# Patient Record
Sex: MALE | Race: BLACK OR AFRICAN AMERICAN | Employment: FULL TIME | URBAN - METROPOLITAN AREA
[De-identification: names, ages, dates, MRNs, and addresses within clinical notes are randomized per-mention and may not be internally consistent; named-entity substitution may affect disease eponyms.]

---

## 2017-05-15 ENCOUNTER — OFFICE VISIT (OUTPATIENT)
Dept: FAMILY MEDICINE CLINIC | Age: 62
End: 2017-05-15

## 2017-05-15 VITALS
OXYGEN SATURATION: 97 % | WEIGHT: 194.8 LBS | SYSTOLIC BLOOD PRESSURE: 118 MMHG | HEIGHT: 72 IN | BODY MASS INDEX: 26.38 KG/M2 | TEMPERATURE: 97.8 F | DIASTOLIC BLOOD PRESSURE: 76 MMHG | HEART RATE: 98 BPM

## 2017-05-15 DIAGNOSIS — Z00.00 ROUTINE GENERAL MEDICAL EXAMINATION AT A HEALTH CARE FACILITY: ICD-10-CM

## 2017-05-15 DIAGNOSIS — Z11.4 SCREENING FOR HIV (HUMAN IMMUNODEFICIENCY VIRUS): ICD-10-CM

## 2017-05-15 DIAGNOSIS — Z11.59 NEED FOR HEPATITIS C SCREENING TEST: ICD-10-CM

## 2017-05-15 DIAGNOSIS — Z12.5 SCREENING PSA (PROSTATE SPECIFIC ANTIGEN): ICD-10-CM

## 2017-05-15 DIAGNOSIS — Z00.00 ROUTINE GENERAL MEDICAL EXAMINATION AT A HEALTH CARE FACILITY: Primary | ICD-10-CM

## 2017-05-15 DIAGNOSIS — Z23 NEED FOR TDAP VACCINATION: ICD-10-CM

## 2017-05-15 LAB
A/G RATIO: 2.2 (ref 1.1–2.2)
ALBUMIN SERPL-MCNC: 4.4 G/DL (ref 3.4–5)
ALP BLD-CCNC: 68 U/L (ref 40–129)
ALT SERPL-CCNC: 31 U/L (ref 10–40)
ANION GAP SERPL CALCULATED.3IONS-SCNC: 16 MMOL/L (ref 3–16)
AST SERPL-CCNC: 30 U/L (ref 15–37)
BASOPHILS ABSOLUTE: 0 K/UL (ref 0–0.2)
BASOPHILS RELATIVE PERCENT: 0.7 %
BILIRUB SERPL-MCNC: 0.4 MG/DL (ref 0–1)
BILIRUBIN, POC: NORMAL
BLOOD URINE, POC: NORMAL
BUN BLDV-MCNC: 14 MG/DL (ref 7–20)
CALCIUM SERPL-MCNC: 9 MG/DL (ref 8.3–10.6)
CHLORIDE BLD-SCNC: 104 MMOL/L (ref 99–110)
CHOLESTEROL, TOTAL: 183 MG/DL (ref 0–199)
CLARITY, POC: CLEAR
CO2: 24 MMOL/L (ref 21–32)
COLOR, POC: YELLOW
CREAT SERPL-MCNC: 1 MG/DL (ref 0.8–1.3)
EOSINOPHILS ABSOLUTE: 0.3 K/UL (ref 0–0.6)
EOSINOPHILS RELATIVE PERCENT: 3.5 %
GFR AFRICAN AMERICAN: >60
GFR NON-AFRICAN AMERICAN: >60
GLOBULIN: 2 G/DL
GLUCOSE BLD-MCNC: 98 MG/DL (ref 70–99)
GLUCOSE URINE, POC: NORMAL
HCT VFR BLD CALC: 47.4 % (ref 40.5–52.5)
HDLC SERPL-MCNC: 68 MG/DL (ref 40–60)
HEMOGLOBIN: 15.6 G/DL (ref 13.5–17.5)
HEPATITIS C ANTIBODY INTERPRETATION: NORMAL
KETONES, POC: NORMAL
LDL CHOLESTEROL CALCULATED: 104 MG/DL
LEUKOCYTE EST, POC: NORMAL
LYMPHOCYTES ABSOLUTE: 1.9 K/UL (ref 1–5.1)
LYMPHOCYTES RELATIVE PERCENT: 27.1 %
MCH RBC QN AUTO: 31.1 PG (ref 26–34)
MCHC RBC AUTO-ENTMCNC: 33 G/DL (ref 31–36)
MCV RBC AUTO: 94.1 FL (ref 80–100)
MONOCYTES ABSOLUTE: 0.6 K/UL (ref 0–1.3)
MONOCYTES RELATIVE PERCENT: 8.9 %
NEUTROPHILS ABSOLUTE: 4.3 K/UL (ref 1.7–7.7)
NEUTROPHILS RELATIVE PERCENT: 59.8 %
NITRITE, POC: NORMAL
PDW BLD-RTO: 13.1 % (ref 12.4–15.4)
PH, POC: 7
PLATELET # BLD: 161 K/UL (ref 135–450)
PMV BLD AUTO: 10.3 FL (ref 5–10.5)
POTASSIUM SERPL-SCNC: 4.5 MMOL/L (ref 3.5–5.1)
PROSTATE SPECIFIC ANTIGEN: 1.5 NG/ML (ref 0–4)
PROTEIN, POC: NORMAL
RBC # BLD: 5.03 M/UL (ref 4.2–5.9)
SODIUM BLD-SCNC: 144 MMOL/L (ref 136–145)
SPECIFIC GRAVITY, POC: 1.01
TOTAL PROTEIN: 6.4 G/DL (ref 6.4–8.2)
TRIGL SERPL-MCNC: 55 MG/DL (ref 0–150)
TSH SERPL DL<=0.05 MIU/L-ACNC: 1.8 UIU/ML (ref 0.27–4.2)
UROBILINOGEN, POC: 0.2
VITAMIN D 25-HYDROXY: 50.2 NG/ML
VLDLC SERPL CALC-MCNC: 11 MG/DL
WBC # BLD: 7.1 K/UL (ref 4–11)

## 2017-05-15 PROCEDURE — 90715 TDAP VACCINE 7 YRS/> IM: CPT | Performed by: FAMILY MEDICINE

## 2017-05-15 PROCEDURE — 99396 PREV VISIT EST AGE 40-64: CPT | Performed by: FAMILY MEDICINE

## 2017-05-15 PROCEDURE — 81002 URINALYSIS NONAUTO W/O SCOPE: CPT | Performed by: FAMILY MEDICINE

## 2017-05-15 PROCEDURE — 90471 IMMUNIZATION ADMIN: CPT | Performed by: FAMILY MEDICINE

## 2017-05-15 ASSESSMENT — PATIENT HEALTH QUESTIONNAIRE - PHQ9
SUM OF ALL RESPONSES TO PHQ QUESTIONS 1-9: 0
2. FEELING DOWN, DEPRESSED OR HOPELESS: 0
1. LITTLE INTEREST OR PLEASURE IN DOING THINGS: 0
SUM OF ALL RESPONSES TO PHQ9 QUESTIONS 1 & 2: 0

## 2017-05-15 ASSESSMENT — ENCOUNTER SYMPTOMS
GASTROINTESTINAL NEGATIVE: 1
ALLERGIC/IMMUNOLOGIC NEGATIVE: 1
RESPIRATORY NEGATIVE: 1
EYES NEGATIVE: 1

## 2017-05-17 LAB — HIV-1 AND HIV-2 ANTIBODIES: NEGATIVE

## 2017-11-30 ENCOUNTER — TELEPHONE (OUTPATIENT)
Dept: OTHER | Facility: CLINIC | Age: 62
End: 2017-11-30

## 2018-03-13 ENCOUNTER — OFFICE VISIT (OUTPATIENT)
Dept: FAMILY MEDICINE CLINIC | Age: 63
End: 2018-03-13

## 2018-03-13 VITALS
HEIGHT: 72 IN | WEIGHT: 201.4 LBS | BODY MASS INDEX: 27.28 KG/M2 | DIASTOLIC BLOOD PRESSURE: 84 MMHG | OXYGEN SATURATION: 96 % | SYSTOLIC BLOOD PRESSURE: 114 MMHG | HEART RATE: 65 BPM | TEMPERATURE: 97.4 F

## 2018-03-13 DIAGNOSIS — Z12.5 SCREENING PSA (PROSTATE SPECIFIC ANTIGEN): ICD-10-CM

## 2018-03-13 DIAGNOSIS — R39.9 LOWER URINARY TRACT SYMPTOMS (LUTS): ICD-10-CM

## 2018-03-13 DIAGNOSIS — Z00.00 ROUTINE GENERAL MEDICAL EXAMINATION AT A HEALTH CARE FACILITY: ICD-10-CM

## 2018-03-13 DIAGNOSIS — E55.9 VITAMIN D DEFICIENCY: ICD-10-CM

## 2018-03-13 DIAGNOSIS — D18.01 HEMANGIOMA OF SKIN AND SUBCUTANEOUS TISSUE: ICD-10-CM

## 2018-03-13 DIAGNOSIS — M51.26 DISPLACEMENT OF LUMBAR INTERVERTEBRAL DISC WITHOUT MYELOPATHY: ICD-10-CM

## 2018-03-13 DIAGNOSIS — Z00.00 ROUTINE GENERAL MEDICAL EXAMINATION AT A HEALTH CARE FACILITY: Primary | ICD-10-CM

## 2018-03-13 DIAGNOSIS — J30.89 CHRONIC NONSEASONAL ALLERGIC RHINITIS DUE TO POLLEN: ICD-10-CM

## 2018-03-13 DIAGNOSIS — D22.9 ATYPICAL NEVUS: ICD-10-CM

## 2018-03-13 DIAGNOSIS — R00.1 BRADYCARDIA: ICD-10-CM

## 2018-03-13 LAB
A/G RATIO: 2 (ref 1.1–2.2)
ALBUMIN SERPL-MCNC: 4.6 G/DL (ref 3.4–5)
ALP BLD-CCNC: 68 U/L (ref 40–129)
ALT SERPL-CCNC: 34 U/L (ref 10–40)
ANION GAP SERPL CALCULATED.3IONS-SCNC: 13 MMOL/L (ref 3–16)
AST SERPL-CCNC: 42 U/L (ref 15–37)
BASOPHILS ABSOLUTE: 0.1 K/UL (ref 0–0.2)
BASOPHILS RELATIVE PERCENT: 0.8 %
BILIRUB SERPL-MCNC: 0.6 MG/DL (ref 0–1)
BILIRUBIN, POC: NORMAL
BLOOD URINE, POC: NORMAL
BUN BLDV-MCNC: 15 MG/DL (ref 7–20)
CALCIUM SERPL-MCNC: 9.2 MG/DL (ref 8.3–10.6)
CHLORIDE BLD-SCNC: 102 MMOL/L (ref 99–110)
CHOLESTEROL, TOTAL: 190 MG/DL (ref 0–199)
CLARITY, POC: CLEAR
CO2: 28 MMOL/L (ref 21–32)
COLOR, POC: YELLOW
CREAT SERPL-MCNC: 1 MG/DL (ref 0.8–1.3)
EOSINOPHILS ABSOLUTE: 0.4 K/UL (ref 0–0.6)
EOSINOPHILS RELATIVE PERCENT: 4.7 %
GFR AFRICAN AMERICAN: >60
GFR NON-AFRICAN AMERICAN: >60
GLOBULIN: 2.3 G/DL
GLUCOSE BLD-MCNC: 95 MG/DL (ref 70–99)
GLUCOSE URINE, POC: NORMAL
HCT VFR BLD CALC: 44.9 % (ref 40.5–52.5)
HDLC SERPL-MCNC: 82 MG/DL (ref 40–60)
HEMOGLOBIN: 15.6 G/DL (ref 13.5–17.5)
KETONES, POC: NORMAL
LDL CHOLESTEROL CALCULATED: 98 MG/DL
LEUKOCYTE EST, POC: NORMAL
LYMPHOCYTES ABSOLUTE: 2.4 K/UL (ref 1–5.1)
LYMPHOCYTES RELATIVE PERCENT: 31.5 %
MCH RBC QN AUTO: 31.8 PG (ref 26–34)
MCHC RBC AUTO-ENTMCNC: 34.8 G/DL (ref 31–36)
MCV RBC AUTO: 91.2 FL (ref 80–100)
MONOCYTES ABSOLUTE: 0.7 K/UL (ref 0–1.3)
MONOCYTES RELATIVE PERCENT: 9.8 %
NEUTROPHILS ABSOLUTE: 4 K/UL (ref 1.7–7.7)
NEUTROPHILS RELATIVE PERCENT: 53.2 %
NITRITE, POC: NORMAL
PDW BLD-RTO: 13.5 % (ref 12.4–15.4)
PH, POC: 6.5
PLATELET # BLD: 161 K/UL (ref 135–450)
PMV BLD AUTO: 10.1 FL (ref 5–10.5)
POTASSIUM SERPL-SCNC: 3.9 MMOL/L (ref 3.5–5.1)
PROSTATE SPECIFIC ANTIGEN: 1.89 NG/ML (ref 0–4)
PROTEIN, POC: NORMAL
RBC # BLD: 4.92 M/UL (ref 4.2–5.9)
SODIUM BLD-SCNC: 143 MMOL/L (ref 136–145)
SPECIFIC GRAVITY, POC: 1
TOTAL PROTEIN: 6.9 G/DL (ref 6.4–8.2)
TRIGL SERPL-MCNC: 51 MG/DL (ref 0–150)
TSH SERPL DL<=0.05 MIU/L-ACNC: 2 UIU/ML (ref 0.27–4.2)
UROBILINOGEN, POC: 0.2
VITAMIN D 25-HYDROXY: 42 NG/ML
VLDLC SERPL CALC-MCNC: 10 MG/DL
WBC # BLD: 7.5 K/UL (ref 4–11)

## 2018-03-13 PROCEDURE — 81002 URINALYSIS NONAUTO W/O SCOPE: CPT | Performed by: FAMILY MEDICINE

## 2018-03-13 PROCEDURE — 99396 PREV VISIT EST AGE 40-64: CPT | Performed by: FAMILY MEDICINE

## 2018-03-13 RX ORDER — MULTIVITAMIN WITH IRON
250 TABLET ORAL DAILY
COMMUNITY

## 2018-03-13 ASSESSMENT — ENCOUNTER SYMPTOMS
ALLERGIC/IMMUNOLOGIC NEGATIVE: 1
COLOR CHANGE: 1
RESPIRATORY NEGATIVE: 1
GASTROINTESTINAL NEGATIVE: 1
EYES NEGATIVE: 1

## 2018-03-14 NOTE — PROGRESS NOTES
Mouth/Throat: Uvula is midline, oropharynx is clear and moist and mucous membranes are normal. No oral lesions. Normal dentition. No dental caries. No oropharyngeal exudate, posterior oropharyngeal edema, posterior oropharyngeal erythema or tonsillar abscesses. Eyes: Conjunctivae and EOM are normal. Pupils are equal, round, and reactive to light. Right eye exhibits no discharge. Left eye exhibits no discharge. No scleral icterus. Neck: Normal range of motion. Neck supple. Normal carotid pulses, no hepatojugular reflux and no JVD present. No spinous process tenderness and no muscular tenderness present. Carotid bruit is not present. No neck rigidity. No tracheal deviation, no edema, no erythema and normal range of motion present. No thyroid mass and no thyromegaly present. Cardiovascular: Normal rate, regular rhythm, S1 normal, S2 normal, normal heart sounds and intact distal pulses. PMI is not displaced. Exam reveals no gallop, no distant heart sounds and no friction rub. No murmur heard. Pulses:       Dorsalis pedis pulses are 2+ on the right side, and 2+ on the left side. Posterior tibial pulses are 2+ on the right side, and 2+ on the left side. Pulmonary/Chest: Effort normal and breath sounds normal. No accessory muscle usage or stridor. No respiratory distress. He has no decreased breath sounds. He has no wheezes. He has no rales. He exhibits no tenderness. Abdominal: Soft. Bowel sounds are normal. He exhibits no distension, no abdominal bruit, no ascites and no mass. There is no hepatosplenomegaly. There is no tenderness. There is no rebound, no guarding and no CVA tenderness. No hernia. Hernia confirmed negative in the right inguinal area and confirmed negative in the left inguinal area.    Genitourinary: Rectum normal, prostate normal, testes normal and penis normal. Rectal exam shows no external hemorrhoid, no fissure, no mass, no tenderness, anal tone normal and guaiac negative

## 2018-12-19 ENCOUNTER — OFFICE VISIT (OUTPATIENT)
Dept: FAMILY MEDICINE CLINIC | Age: 63
End: 2018-12-19
Payer: COMMERCIAL

## 2018-12-19 VITALS
BODY MASS INDEX: 28.72 KG/M2 | WEIGHT: 200.6 LBS | OXYGEN SATURATION: 92 % | SYSTOLIC BLOOD PRESSURE: 127 MMHG | HEART RATE: 71 BPM | HEIGHT: 70 IN | TEMPERATURE: 98.2 F | DIASTOLIC BLOOD PRESSURE: 77 MMHG

## 2018-12-19 DIAGNOSIS — L30.9 DERMATITIS: Primary | ICD-10-CM

## 2018-12-19 DIAGNOSIS — Z00.00 ROUTINE GENERAL MEDICAL EXAMINATION AT A HEALTH CARE FACILITY: ICD-10-CM

## 2018-12-19 DIAGNOSIS — Z12.5 PROSTATE CANCER SCREENING: ICD-10-CM

## 2018-12-19 DIAGNOSIS — E55.9 VITAMIN D DEFICIENCY: ICD-10-CM

## 2018-12-19 PROCEDURE — 99203 OFFICE O/P NEW LOW 30 MIN: CPT | Performed by: FAMILY MEDICINE

## 2018-12-19 RX ORDER — CLOTRIMAZOLE AND BETAMETHASONE DIPROPIONATE 10; .64 MG/G; MG/G
CREAM TOPICAL
Qty: 15 G | Refills: 0 | Status: SHIPPED | OUTPATIENT
Start: 2018-12-19 | End: 2019-03-25

## 2018-12-19 NOTE — PATIENT INSTRUCTIONS
1) Ask your local pharmacy about availability of Shingrix. Check out [email or web address].  2) Plan your yearly check-up by summer 2019.  3) You have the option to do the fasting labwork 1-2 weeks for the appt.

## 2019-03-21 DIAGNOSIS — E55.9 VITAMIN D DEFICIENCY: ICD-10-CM

## 2019-03-21 DIAGNOSIS — Z00.00 ROUTINE GENERAL MEDICAL EXAMINATION AT A HEALTH CARE FACILITY: ICD-10-CM

## 2019-03-21 DIAGNOSIS — Z12.5 PROSTATE CANCER SCREENING: ICD-10-CM

## 2019-03-21 LAB
A/G RATIO: 2 (ref 1.1–2.2)
ALBUMIN SERPL-MCNC: 4.5 G/DL (ref 3.4–5)
ALP BLD-CCNC: 64 U/L (ref 40–129)
ALT SERPL-CCNC: 27 U/L (ref 10–40)
ANION GAP SERPL CALCULATED.3IONS-SCNC: 11 MMOL/L (ref 3–16)
AST SERPL-CCNC: 36 U/L (ref 15–37)
BILIRUB SERPL-MCNC: 0.6 MG/DL (ref 0–1)
BUN BLDV-MCNC: 18 MG/DL (ref 7–20)
CALCIUM SERPL-MCNC: 9.3 MG/DL (ref 8.3–10.6)
CHLORIDE BLD-SCNC: 105 MMOL/L (ref 99–110)
CHOLESTEROL, TOTAL: 169 MG/DL (ref 0–199)
CO2: 26 MMOL/L (ref 21–32)
CREAT SERPL-MCNC: 0.9 MG/DL (ref 0.8–1.3)
GFR AFRICAN AMERICAN: >60
GFR NON-AFRICAN AMERICAN: >60
GLOBULIN: 2.3 G/DL
GLUCOSE BLD-MCNC: 97 MG/DL (ref 70–99)
HDLC SERPL-MCNC: 72 MG/DL (ref 40–60)
LDL CHOLESTEROL CALCULATED: 90 MG/DL
POTASSIUM SERPL-SCNC: 4.4 MMOL/L (ref 3.5–5.1)
PROSTATE SPECIFIC ANTIGEN: 2.21 NG/ML (ref 0–4)
SODIUM BLD-SCNC: 142 MMOL/L (ref 136–145)
TOTAL PROTEIN: 6.8 G/DL (ref 6.4–8.2)
TRIGL SERPL-MCNC: 37 MG/DL (ref 0–150)
TSH SERPL DL<=0.05 MIU/L-ACNC: 1.15 UIU/ML (ref 0.27–4.2)
VITAMIN D 25-HYDROXY: 36 NG/ML
VLDLC SERPL CALC-MCNC: 7 MG/DL

## 2019-03-25 ENCOUNTER — OFFICE VISIT (OUTPATIENT)
Dept: FAMILY MEDICINE CLINIC | Age: 64
End: 2019-03-25
Payer: COMMERCIAL

## 2019-03-25 VITALS
TEMPERATURE: 97.6 F | DIASTOLIC BLOOD PRESSURE: 76 MMHG | RESPIRATION RATE: 10 BRPM | HEART RATE: 74 BPM | SYSTOLIC BLOOD PRESSURE: 123 MMHG | HEIGHT: 71 IN | BODY MASS INDEX: 28.14 KG/M2 | WEIGHT: 201 LBS | OXYGEN SATURATION: 96 %

## 2019-03-25 DIAGNOSIS — Z12.5 PROSTATE CANCER SCREENING: ICD-10-CM

## 2019-03-25 DIAGNOSIS — Z00.00 ROUTINE GENERAL MEDICAL EXAMINATION AT A HEALTH CARE FACILITY: Primary | ICD-10-CM

## 2019-03-25 DIAGNOSIS — H90.3 SENSORY HEARING LOSS, BILATERAL: ICD-10-CM

## 2019-03-25 PROCEDURE — 99396 PREV VISIT EST AGE 40-64: CPT | Performed by: FAMILY MEDICINE

## 2019-03-25 ASSESSMENT — PATIENT HEALTH QUESTIONNAIRE - PHQ9
2. FEELING DOWN, DEPRESSED OR HOPELESS: 0
SUM OF ALL RESPONSES TO PHQ QUESTIONS 1-9: 0
SUM OF ALL RESPONSES TO PHQ9 QUESTIONS 1 & 2: 0
SUM OF ALL RESPONSES TO PHQ QUESTIONS 1-9: 0
1. LITTLE INTEREST OR PLEASURE IN DOING THINGS: 0

## 2020-07-06 ENCOUNTER — TELEPHONE (OUTPATIENT)
Dept: NEUROLOGY | Age: 65
End: 2020-07-06

## 2021-02-25 ENCOUNTER — IMMUNIZATION (OUTPATIENT)
Dept: PRIMARY CARE CLINIC | Age: 66
End: 2021-02-25
Payer: COMMERCIAL

## 2021-02-25 PROCEDURE — 91301 COVID-19, MODERNA VACCINE 100MCG/0.5ML DOSE: CPT | Performed by: FAMILY MEDICINE

## 2021-02-25 PROCEDURE — 0011A COVID-19, MODERNA VACCINE 100MCG/0.5ML DOSE: CPT | Performed by: FAMILY MEDICINE

## 2021-03-25 ENCOUNTER — IMMUNIZATION (OUTPATIENT)
Dept: PRIMARY CARE CLINIC | Age: 66
End: 2021-03-25
Payer: COMMERCIAL

## 2021-03-25 PROCEDURE — 0012A PR IMM ADMN SARSCOV2 100 MCG/0.5 ML 2ND DOSE: CPT | Performed by: FAMILY MEDICINE

## 2021-03-26 PROCEDURE — 91301 COVID-19, MODERNA VACCINE 100MCG/0.5ML DOSE: CPT | Performed by: FAMILY MEDICINE

## 2021-07-21 ENCOUNTER — TELEPHONE (OUTPATIENT)
Dept: FAMILY MEDICINE CLINIC | Age: 66
End: 2021-07-21

## 2021-07-21 DIAGNOSIS — E55.9 VITAMIN D DEFICIENCY: ICD-10-CM

## 2021-07-21 DIAGNOSIS — E78.5 ELEVATED LIPIDS: Primary | ICD-10-CM

## 2021-07-21 DIAGNOSIS — R39.9 LOWER URINARY TRACT SYMPTOMS (LUTS): ICD-10-CM

## 2021-07-21 NOTE — TELEPHONE ENCOUNTER
----- Message from Sophia Foss sent at 7/21/2021 10:50 AM EDT -----  Subject: Message to Provider    QUESTIONS  Information for Provider? Patient is wanting to see if Dr Ely Christian can   order their bloodwork for their upcoming physical so that the results are   available during their appointment 8-21. Please advsie  ---------------------------------------------------------------------------  --------------  CALL BACK INFO  What is the best way for the office to contact you? OK to leave message on   voicemail  Preferred Call Back Phone Number? [phone]  ---------------------------------------------------------------------------  --------------  SCRIPT ANSWERS  Relationship to Patient?  Self

## 2021-07-22 NOTE — TELEPHONE ENCOUNTER
Lab orders are in. Let him know. The ASCVD Risk score (Irma Mays., et al., 2013) failed to calculate for the following reasons:     The systolic blood pressure is missing

## 2021-07-30 DIAGNOSIS — R39.9 LOWER URINARY TRACT SYMPTOMS (LUTS): ICD-10-CM

## 2021-07-30 DIAGNOSIS — E78.5 ELEVATED LIPIDS: ICD-10-CM

## 2021-07-30 DIAGNOSIS — E55.9 VITAMIN D DEFICIENCY: ICD-10-CM

## 2021-07-30 LAB
A/G RATIO: 2.3 (ref 1.1–2.2)
ALBUMIN SERPL-MCNC: 4.8 G/DL (ref 3.4–5)
ALP BLD-CCNC: 73 U/L (ref 40–129)
ALT SERPL-CCNC: 20 U/L (ref 10–40)
ANION GAP SERPL CALCULATED.3IONS-SCNC: 13 MMOL/L (ref 3–16)
AST SERPL-CCNC: 28 U/L (ref 15–37)
BILIRUB SERPL-MCNC: 0.8 MG/DL (ref 0–1)
BUN BLDV-MCNC: 17 MG/DL (ref 7–20)
CALCIUM SERPL-MCNC: 9.5 MG/DL (ref 8.3–10.6)
CHLORIDE BLD-SCNC: 103 MMOL/L (ref 99–110)
CHOLESTEROL, TOTAL: 193 MG/DL (ref 0–199)
CO2: 25 MMOL/L (ref 21–32)
CREAT SERPL-MCNC: 1 MG/DL (ref 0.8–1.3)
GFR AFRICAN AMERICAN: >60
GFR NON-AFRICAN AMERICAN: >60
GLOBULIN: 2.1 G/DL
GLUCOSE BLD-MCNC: 94 MG/DL (ref 70–99)
HDLC SERPL-MCNC: 66 MG/DL (ref 40–60)
LDL CHOLESTEROL CALCULATED: 114 MG/DL
POTASSIUM SERPL-SCNC: 4.2 MMOL/L (ref 3.5–5.1)
PROSTATE SPECIFIC ANTIGEN: 2.48 NG/ML (ref 0–4)
SODIUM BLD-SCNC: 141 MMOL/L (ref 136–145)
TOTAL PROTEIN: 6.9 G/DL (ref 6.4–8.2)
TRIGL SERPL-MCNC: 63 MG/DL (ref 0–150)
VITAMIN D 25-HYDROXY: 50 NG/ML
VLDLC SERPL CALC-MCNC: 13 MG/DL

## 2021-08-24 ENCOUNTER — OFFICE VISIT (OUTPATIENT)
Dept: FAMILY MEDICINE CLINIC | Age: 66
End: 2021-08-24
Payer: COMMERCIAL

## 2021-08-24 VITALS
RESPIRATION RATE: 16 BRPM | DIASTOLIC BLOOD PRESSURE: 80 MMHG | HEART RATE: 70 BPM | BODY MASS INDEX: 28.56 KG/M2 | SYSTOLIC BLOOD PRESSURE: 128 MMHG | OXYGEN SATURATION: 97 % | WEIGHT: 204 LBS | TEMPERATURE: 96.2 F | HEIGHT: 71 IN

## 2021-08-24 DIAGNOSIS — Z00.00 ROUTINE GENERAL MEDICAL EXAMINATION AT A HEALTH CARE FACILITY: Primary | ICD-10-CM

## 2021-08-24 DIAGNOSIS — H91.90 DECREASED HEARING, UNSPECIFIED LATERALITY: ICD-10-CM

## 2021-08-24 DIAGNOSIS — R97.20 INCREASED PROSTATE SPECIFIC ANTIGEN (PSA) VELOCITY: ICD-10-CM

## 2021-08-24 DIAGNOSIS — Z80.42 FHX: PROSTATE CANCER: ICD-10-CM

## 2021-08-24 DIAGNOSIS — R35.1 FREQUENT URINATION AT NIGHT: ICD-10-CM

## 2021-08-24 PROCEDURE — 99397 PER PM REEVAL EST PAT 65+ YR: CPT | Performed by: FAMILY MEDICINE

## 2021-08-24 SDOH — ECONOMIC STABILITY: FOOD INSECURITY: WITHIN THE PAST 12 MONTHS, THE FOOD YOU BOUGHT JUST DIDN'T LAST AND YOU DIDN'T HAVE MONEY TO GET MORE.: NEVER TRUE

## 2021-08-24 SDOH — ECONOMIC STABILITY: TRANSPORTATION INSECURITY
IN THE PAST 12 MONTHS, HAS LACK OF TRANSPORTATION KEPT YOU FROM MEETINGS, WORK, OR FROM GETTING THINGS NEEDED FOR DAILY LIVING?: NO

## 2021-08-24 SDOH — ECONOMIC STABILITY: FOOD INSECURITY: WITHIN THE PAST 12 MONTHS, YOU WORRIED THAT YOUR FOOD WOULD RUN OUT BEFORE YOU GOT MONEY TO BUY MORE.: NEVER TRUE

## 2021-08-24 SDOH — ECONOMIC STABILITY: TRANSPORTATION INSECURITY
IN THE PAST 12 MONTHS, HAS THE LACK OF TRANSPORTATION KEPT YOU FROM MEDICAL APPOINTMENTS OR FROM GETTING MEDICATIONS?: NO

## 2021-08-24 ASSESSMENT — PATIENT HEALTH QUESTIONNAIRE - PHQ9
SUM OF ALL RESPONSES TO PHQ QUESTIONS 1-9: 0
SUM OF ALL RESPONSES TO PHQ9 QUESTIONS 1 & 2: 0
SUM OF ALL RESPONSES TO PHQ QUESTIONS 1-9: 0
2. FEELING DOWN, DEPRESSED OR HOPELESS: 0
SUM OF ALL RESPONSES TO PHQ QUESTIONS 1-9: 0
1. LITTLE INTEREST OR PLEASURE IN DOING THINGS: 0

## 2021-08-24 ASSESSMENT — SOCIAL DETERMINANTS OF HEALTH (SDOH): HOW HARD IS IT FOR YOU TO PAY FOR THE VERY BASICS LIKE FOOD, HOUSING, MEDICAL CARE, AND HEATING?: NOT HARD AT ALL

## 2021-08-24 NOTE — PATIENT INSTRUCTIONS
NEXT COLONOSCOPY IS DUE MAY 2024. YOUR PCP RECOMMENDS YOU GET VACCINATED AGAINST SHINGLES AND PNEUMONIA ([id number]). TALK TO YOUR PHARMACY TEAM ABOUT AVAILABILITY AND CO-PAY. The LABWORK WAS NORMAL AND IN GOAL RANGE EXCEPT LDL (BAD CHOLESTEROL). INCORPORATING FITNESS CAN GET THAT LDL BELOW 100.     WITH FHX OF PROSTATE CANCER, REVIEW MATERIAL ON [email or web address].      CONSIDER GETTING HEARING EVALUATED AT A [employer] OR [facility].

## 2021-08-24 NOTE — PROGRESS NOTES
[facility]  Progress Note  DO Ebonie Russo  1955 08/25/21    Chief Complaint:   Ebonie Caal is a 73 y.o. male who is here for physical        HPI:   Has not been seen since the pandemic began. Otherwise is doing well all things considered. History of prostate cancer in the family. PSA level checked with a recent blood work. Gradual increase in levels. Results for Raymon Spears (MRN <[id number]>) as of 8/25/2021 08:44   Ref. Range 3/25/2016 10:16 5/15/2017 10:15 3/13/2018 09:41 3/21/2019 08:41 7/30/2021 08:05   Prostatic Specific Ag Latest Ref Range: 0.00 - 4.00 ng/mL 1.11 1.50 1.89 2.21 2.48     Denies any straining with urination but does endorse nighttime voiding 2-3 times a night interrupting his sleep. Does hydrate well and identifies with possible evening hydration as a cause. The 10-year ASCVD risk score (Irma Rick, et al., 2013) is: 9%    Values used to calculate the score:      Age: 73 years      Sex: Male      Is Non- : Yes      Diabetic: No      Tobacco smoker: No      Systolic Blood Pressure: 937 mmHg      Is BP treated: No      HDL Cholesterol: 66 mg/dL      Total Cholesterol: 193 mg/dL    Does mention decreased hearing lately. Not known to be exposed to loud noises as a child or young adult. ROS negative for headache, visionchanges, chest pain, shortness of breath, abdominal pain, urinary sx, bowel changes. Past medical, surgical, and social history reviewed. and allergies reviewed. Allergies   Allergen Reactions    Shellfish-Derived Products Rash     Prior to Visit Medications    Medication Sig Taking?  Authorizing Provider   Cholecalciferol (VITAMIN D3 PO) Take 800 Int'l Units by mouth daily Yes Historical Provider, MD   Calcium Carbonate (CALCIUM 600 PO) Take 1 tablet by mouth daily Yes Historical Provider, MD   Glucosamine-Chondroitin--198-314 MG TABS Take 1 tablet by mouth daily Yes Historical Provider, MD   POTASSIUM AMINOBENZOATE PO Take 1 capsule by mouth daily 550mg Yes Historical Provider, MD   magnesium (MAGNESIUM-OXIDE) 250 MG TABS tablet Take 250 mg by mouth daily Yes Historical Provider, MD   B Complex-Biotin-FA (B COMPLETE) TABS Take 0.5 tablets by mouth daily  Yes Historical Provider, MD   aspirin 81 MG tablet Take 81 mg by mouth daily. Yes Natalio Levi MD   Ascorbic Acid (VITAMIN C) 500 MG tablet Take 1,000 mg by mouth daily  Yes Natalio Levi MD   multivitamin [facility]) per tablet Take 1 tablet by mouth daily.  Yes Natalio Levi MD          Vitals:    08/24/21 1359 08/24/21 1501   BP: (!) 142/96 128/80   Pulse: 70    Resp: 16    Temp: 96.2 °F (35.7 °C)    TempSrc: Tympanic    SpO2: 97%    Weight: 204 lb (92.5 kg)    Height: 5' 10.5\" (1.791 m)       Wt Readings from Last 3 Encounters:   08/24/21 204 lb (92.5 kg)   03/25/19 201 lb (91.2 kg)   12/19/18 200 lb 9.6 oz (91 kg)     BP Readings from Last 3 Encounters:   08/24/21 128/80   03/25/19 123/76   12/19/18 127/77       Patient Active Problem List   Diagnosis    Contact dermatitis and other eczema, due to unspecified cause    Displacement of lumbar intervertebral disc without myelopathy    Allergic rhinitis    Vitamin D deficiency    Bradycardia    Near syncope    Lower urinary tract symptoms (LUTS)    Patient is Caodaism--SEE DURABLE POWER OF ATTY FOR HEALTHCARE IN [facility] MEDIA    Sensory hearing loss, bilateral    FHx: prostate cancer       Immunization History   Administered Date(s) Administered    COVID-19, Moderna, PF, 100mcg/0.5mL 02/25/2021, 03/26/2021    Td, unspecified formulation 05/04/2006    Tdap (Boostrix, Adacel) 05/04/2006, 05/15/2017       Past Medical History:   Diagnosis Date    Chicken pox     Chronic back pain     Hemorrhoids     Hernia, inguinal     Herniated lumbar intervertebral disc     Mumps      Past Surgical History:   Procedure Laterality Date    HERNIA REPAIR INGUIOINAL AND UMBILICAL. [address], 2008    TONSILLECTOMY      childhood    WISDOM TOOTH EXTRACTION       Family History   Problem Relation Age of Onset    High Blood Pressure Mother     Heart Disease Mother         a fib, [de-identified]    Atrial Fibrillation Mother     Cancer Father         prostate, stage II    Heart Disease Father         MI with stent in his early [de-identified] High Cholesterol Father     High Blood Pressure Brother     High Cholesterol Brother     Cancer Maternal Grandfather     Colon Cancer Paternal Grandfather      Social History     Socioeconomic History    Marital status:      Spouse name: Not on file    Number of children: Not on file    Years of education: Not on file    Highest education level: Not on file   Occupational History    Not on file   Tobacco Use    Smoking status: Never Smoker    Smokeless tobacco: Never Used   Substance and Sexual Activity    Alcohol use: No    Drug use: No    Sexual activity: Yes   Other Topics Concern    Not on file   Social History Narrative    Not on file     Social Determinants of Health     Financial Resource Strain: Low Risk     Difficulty of Paying Living Expenses: Not hard at all   Food Insecurity: No Food Insecurity    Worried About Running Out of Food in the Last Year: Never true    Carlo of Food in the Last Year: Never true   Transportation Needs: No Transportation Needs    Lack of Transportation (Medical): No    Lack of Transportation (Non-Medical):  No   Physical Activity:     Days of Exercise per Week:     Minutes of Exercise per Session:    Stress:     Feeling of Stress :    Social Connections:     Frequency of Communication with Friends and Family:     Frequency of Social Gatherings with Friends and Family:     Attends Hinduism Services:     Active Member of Clubs or Organizations:     Attends Club or Organization Meetings:     Marital Status:    Intimate Partner Violence:     Fear of Current or Ex-Partner:     Emotionally Abused:     Physically Abused:     Sexually Abused:        O: /80   Pulse 70   Temp 96.2 °F (35.7 °C) (Tympanic)   Resp 16   Ht 5' 10.5\" (1.791 m)   Wt 204 lb (92.5 kg)   SpO2 97%   BMI 28.86 kg/m²   Physical Exam  GEN: No acute distress,cooperative, well nourished, alert. HEENT: PEERLA, EOMI , normocephalic/atraumatic, external nose appears normal.  External ear is normal.  No identified cerumen. Tympanic membrane otherwise normal bilaterally. Neck: soft, supple, no appreciable thyromegaly,mass  CV: No upper extremity edema. Resp:  Breathing comfortably. Psych:normal affect. Neuro: AOx3  Other Pertinent Physical Exam findings:   Heart: Normal S1 and S2 with regular rhythm. Lungs: Clear to auscultation bilaterally. Abd: No tenderness to palpation. ASSESSMENT   Diagnosis Orders   1. Routine general medical examination at a health care facility     2. Frequent urination at night     3. Increased prostate specific antigen (PSA) velocity     4. FHx: prostate cancer     5. Decreased hearing, unspecified laterality         #2 and #3 and #4:  Reviewed options of repeating PSA versus urology referral versus Exosomedx urine test.  Patient prefers to proceed with the urine test.  Given the phone number so he can check his insurance coverage. Specimen collected today and we will send it out. #5: Not due to cerumen. Encouraged audiology evaluation.     PLAN          Time spent on encounter (including any number of the following: review of labs, imaging, provider notes, outside hospital records; performing examination/evaluation; counseling patient and family; ordering medications/tests; placing referrals and communication with referring physicians; coordination of care, and documentation in the EHR): 44 minutes  Established E/M: 10-19 (29425), 20-29 (12671), 30-39 (40652), 40-54 (43545)   New E/M: 15-29 (67336), 30-44 (28273), 45-59 (17780), 60-74 (56498)  Telephone E/M: 5-10 ([phone]), 11-20 (75368), 21-30 (58380)    If applicable, see additional patient information and instructions under \"Patient Instructions. \"    Return in about 1 year (around 8/24/2022) for Medicare Annual Wellness Visit. Patient Instructions   NEXT COLONOSCOPY IS DUE MAY 2024. YOUR PCP RECOMMENDS YOU GET VACCINATED AGAINST SHINGLES AND PNEUMONIA ([id number]). TALK TO YOUR PHARMACY TEAM ABOUT AVAILABILITY AND CO-PAY. The LABWORK WAS NORMAL AND IN GOAL RANGE EXCEPT LDL (BAD CHOLESTEROL). INCORPORATING FITNESS CAN GET THAT LDL BELOW 100. WITH FHX OF PROSTATE CANCER, REVIEW MATERIAL ON [email or web address].      CONSIDER GETTING HEARING EVALUATED AT A [employer] OR [facility]        Please note a portion of this chart was generated using dragon dictation software. Although every effort was made to ensure the accuracy of this automated transcription,some errors in transcription may have occurred.

## 2021-08-25 PROBLEM — Z80.42 FHX: PROSTATE CANCER: Status: ACTIVE | Noted: 2021-08-25

## 2021-09-03 ENCOUNTER — TELEPHONE (OUTPATIENT)
Dept: FAMILY MEDICINE CLINIC | Age: 66
End: 2021-09-03

## 2021-09-03 DIAGNOSIS — Z13.220 SCREENING FOR HYPERLIPIDEMIA: ICD-10-CM

## 2021-09-03 DIAGNOSIS — Z13.1 SCREENING FOR DIABETES MELLITUS: Primary | ICD-10-CM

## 2021-09-03 DIAGNOSIS — Z13.21 ENCOUNTER FOR VITAMIN DEFICIENCY SCREENING: ICD-10-CM

## 2021-09-03 DIAGNOSIS — Z12.5 ENCOUNTER FOR PROSTATE CANCER SCREENING: ICD-10-CM

## 2021-09-03 NOTE — TELEPHONE ENCOUNTER
PT called in wanting to know if there is anyway we can change any codes on the labs.  Stating they should have been coded as a physical though I noticed they were completed a month before his physical.     If we can change any or not please notify lab below by next week:    Please call [phone] Glen Bumpers with [facility]

## 2021-09-07 NOTE — TELEPHONE ENCOUNTER
The previous  Codes included elevated lipids, vitamin D deficiency, lower urinary tract symptoms. Please call back lab with the following: For the comprehensive metabolic panel use the diagnosis of screening for diabetes Z13.1    For the vitamin D 25 hydroxy use the encounter for vitamin deficiency screening Z13.21    For PSA use the encounter for prostate cancer screening Z12.5    For  lipid panel use screening for hyperlipidemia Z13.220         Diagnosis Orders   1. Screening for diabetes mellitus     2. Encounter for vitamin deficiency screening     3. Encounter for prostate cancer screening     4.  Screening for hyperlipidemia

## 2021-10-07 ENCOUNTER — TELEPHONE (OUTPATIENT)
Dept: FAMILY MEDICINE CLINIC | Age: 66
End: 2021-10-07

## 2021-10-07 NOTE — TELEPHONE ENCOUNTER
Pt called in stating the test that was performed for his prostate exam was denied by his insurance and he is wanting to know if we can start an appeal for this to be covered. Please call with further information. Please advise, thank you!

## 2021-10-08 ENCOUNTER — TELEPHONE (OUTPATIENT)
Dept: FAMILY MEDICINE CLINIC | Age: 66
End: 2021-10-08

## 2021-10-08 NOTE — TELEPHONE ENCOUNTER
----- Message from Jeana Lopez sent at 10/8/2021  3:03 PM EDT -----  Subject: Message to Provider    QUESTIONS  Information for Provider? Patient states he will call back after the   weekend . States he will check with Kay since he went today for   first one.  ---------------------------------------------------------------------------  --------------  CALL BACK INFO  What is the best way for the office to contact you? OK to leave message on   voicemail  Preferred Call Back Phone Number?  [phone]  ---------------------------------------------------------------------------  --------------  SCRIPT ANSWERS  undefined

## 2021-10-11 ENCOUNTER — TELEPHONE (OUTPATIENT)
Dept: FAMILY MEDICINE CLINIC | Age: 66
End: 2021-10-11

## 2021-10-11 NOTE — TELEPHONE ENCOUNTER
Cant get through to [facility] number [phone]. Their line states that they are closed due to technical difficulties. Will try again tomorrow.

## 2021-10-15 NOTE — TELEPHONE ENCOUNTER
Pt called stating he will be coming in and dropping off his documents so that we can bundle up his medical documents. Would like these either faxed or mailed to [facility] and then a copy printed off for him. Please call once his copy is ready for him to  his copy.

## 2021-10-18 NOTE — TELEPHONE ENCOUNTER
received payment request. Pt is aware that we have received the appeal request from the insurance. The folder is by dr. Oriana toribio.  Please advise  Thank you

## 2021-10-19 ENCOUNTER — PATIENT MESSAGE (OUTPATIENT)
Dept: FAMILY MEDICINE CLINIC | Age: 66
End: 2021-10-19

## 2021-10-21 NOTE — TELEPHONE ENCOUNTER
1) I created and signed the letter of medical necessity for the Exosomedx test.    It is given to [facility]. 2) Please attach most recent progress note    3) Along with the Exosomedx test result    4) Along with the results of the past PSA blood test from 2016 to the present. Results for Eliezer Parks (MRN <[id number]>) as of 10/21/2021 16:08   Ref.  Range 3/25/2016 10:16 5/15/2017 10:15 3/13/2018 09:41 3/21/2019 08:41 7/30/2021 08:05   Prostatic Specific Ag Latest Ref Range: 0.00 - 4.00 ng/mL 1.11 1.50 1.89 2.21 2.48

## 2021-10-26 ENCOUNTER — TELEPHONE (OUTPATIENT)
Dept: FAMILY MEDICINE CLINIC | Age: 66
End: 2021-10-26

## 2021-10-26 NOTE — TELEPHONE ENCOUNTER
All forms have been faxed to the number that Mary Beth Alexander gave the pt on oct 20. [phone]. Pt advised, pt will be coming to  copies of what was sent today.

## 2022-03-07 ENCOUNTER — TELEPHONE (OUTPATIENT)
Dept: FAMILY MEDICINE CLINIC | Age: 67
End: 2022-03-07

## 2022-03-07 NOTE — TELEPHONE ENCOUNTER
----- Message from Steven Stoner sent at 3/4/2022  3:40 PM EST -----  Subject: Appointment Request    Reason for Call: New Patient Request Appointment    QUESTIONS  Type of Appointment? Established Patient  Reason for appointment request? Requested Provider shaheen Lunsford Givens  Additional Information for Provider? [facility] and   wanting to schedule appointment but it wont show up in system. ---------------------------------------------------------------------------  --------------  Rober MORGAN  What is the best way for the office to contact you? OK to leave message on   voicemail  Preferred Call Back Phone Number? [phone]  ---------------------------------------------------------------------------  --------------  SCRIPT ANSWERS  Relationship to Patient? Self  Specialty Confirmation? Primary Care  Is this the first appointment to establish care for a ? No  Have you been diagnosed with, awaiting test results for, or told that you   are suspected of having COVID-19 (Coronavirus)? (If patient has tested   negative or was tested as a requirement for work, school, or travel and   not based on symptoms, answer no)? No  Within the past 10 days have you developed any of the following symptoms   (answer no if symptoms have been present longer than 10 days or began   more than 10 days ago)? Fever or Chills, Cough, Shortness of breath or   difficulty breathing, Loss of taste or smell, Sore throat, Nasal   congestion, Sneezing or runny nose, Fatigue or generalized body aches   (answer no if pain is specific to a body part e.g. back pain), Diarrhea,   Headache? No  Have you had close contact with someone with COVID-19 in the last 7 days? No  (Service Expert  click yes below to proceed with [employer] As Usual   Scheduling)?  Yes

## 2022-03-14 ENCOUNTER — OFFICE VISIT (OUTPATIENT)
Dept: FAMILY MEDICINE CLINIC | Age: 67
End: 2022-03-14
Payer: COMMERCIAL

## 2022-03-14 VITALS
OXYGEN SATURATION: 98 % | DIASTOLIC BLOOD PRESSURE: 86 MMHG | BODY MASS INDEX: 28.42 KG/M2 | HEIGHT: 71 IN | WEIGHT: 203 LBS | SYSTOLIC BLOOD PRESSURE: 120 MMHG | HEART RATE: 82 BPM

## 2022-03-14 DIAGNOSIS — Z00.00 ANNUAL PHYSICAL EXAM: Primary | ICD-10-CM

## 2022-03-14 PROCEDURE — 99397 PER PM REEVAL EST PAT 65+ YR: CPT | Performed by: FAMILY MEDICINE

## 2022-03-14 ASSESSMENT — PATIENT HEALTH QUESTIONNAIRE - PHQ9
SUM OF ALL RESPONSES TO PHQ QUESTIONS 1-9: 0
SUM OF ALL RESPONSES TO PHQ QUESTIONS 1-9: 0
SUM OF ALL RESPONSES TO PHQ9 QUESTIONS 1 & 2: 0
2. FEELING DOWN, DEPRESSED OR HOPELESS: 0
1. LITTLE INTEREST OR PLEASURE IN DOING THINGS: 0
SUM OF ALL RESPONSES TO PHQ QUESTIONS 1-9: 0
SUM OF ALL RESPONSES TO PHQ QUESTIONS 1-9: 0

## 2022-03-14 NOTE — PROGRESS NOTES
Subjective    Janay Naranjo is a 78 y.o. Male who came into the clinic today for his annual physical and to establish care. Since I am seeing the patient for the first time today, I did review his medical history. The patient is not prescription   medication at this time. The patient informs me that he would likely get his pneumococcal vaccine later this year. The patient is up-to-date on his vaccinations and preventative care. The patient does have a family history of   prostate cancer and recently was tested with Exosomedx in which he was below cutoff for high risk prostate   cancer. Patient had some blood work done in July 2021 which were reviewed with him today. Otherwise today   he did not have any other questions or concerns and all the questions and concerns were appropriately answered.     Patient is Rastafarian--SEE DURABLE POWER OF ATTY FOR HEALTHCARE IN SCANNED MEDIA     I reviewed and discussed below mentioned labs with the patient today:    Lab Results   Component Value Date    WBC 7.5 03/13/2018    RBC 4.92 03/13/2018    MCV 91.2 03/13/2018    MCHC 34.8 03/13/2018     Lab Results   Component Value Date    ANIONGAP 13 07/30/2021    GLUCOSE 94 07/30/2021    BUN 17 07/30/2021    CREATININE 1.0 07/30/2021    AGRATIO 2.3 07/30/2021    CALCIUM 9.5 07/30/2021     Lab Results   Component Value Date    TSH 1.15 03/21/2019     Lab Results   Component Value Date    PSA 2.48 07/30/2021       Past Medical History:   Diagnosis Date    Chicken pox     Chronic back pain     Hemorrhoids     Hernia, inguinal     Herniated lumbar intervertebral disc     Mumps        Patient Active Problem List   Diagnosis    Contact dermatitis and other eczema, due to unspecified cause    Displacement of lumbar intervertebral disc without myelopathy    Allergic rhinitis    Vitamin D deficiency    Bradycardia    Lower urinary tract symptoms (LUTS)    Patient is Rastafarian--SEE DURABLE POWER OF ATTY FOR HEALTHCARE IN [facility] MEDIA    Sensory hearing loss, bilateral    FHx: prostate cancer       Past Surgical History:   Procedure Laterality Date    HERNIA REPAIR      INGUIOINAL AND UMBILICAL. [address], 2008    TONSILLECTOMY      childhood    WISDOM TOOTH EXTRACTION         Family History   Problem Relation Age of Onset    High Blood Pressure Mother     Heart Disease Mother         a [de-identified], [de-identified]    Atrial Fibrillation Mother     Cancer Father         prostate, stage II    Heart Disease Father         MI with stent in his early [de-identified] High Cholesterol Father     High Blood Pressure Brother     High Cholesterol Brother     Cancer Maternal Grandfather     Colon Cancer Paternal Grandfather     Colon Cancer Maternal Aunt        Social History     Tobacco Use    Smoking status: Never Smoker    Smokeless tobacco: Never Used   Substance Use Topics    Alcohol use: No       Current Outpatient Medications on File Prior to Visit   Medication Sig Dispense Refill    Cholecalciferol (VITAMIN D3 PO) Take 800 Int'l Units by mouth daily      Calcium Carbonate (CALCIUM 600 PO) Take 1 tablet by mouth daily      Glucosamine-Chondroitin--400-375 MG TABS Take 1 tablet by mouth daily      POTASSIUM AMINOBENZOATE PO Take 1 capsule by mouth daily 550mg      magnesium (MAGNESIUM-OXIDE) 250 MG TABS tablet Take 250 mg by mouth daily      B Complex-Biotin-FA (B COMPLETE) TABS Take 0.5 tablets by mouth daily       aspirin 81 MG tablet Take 81 mg by mouth daily.  Ascorbic Acid (VITAMIN C) 500 MG tablet Take 1,000 mg by mouth daily       multivitamin (THERAGRAN) per tablet Take 1 tablet by mouth daily. No current facility-administered medications on file prior to visit. Allergies   Allergen Reactions    Shellfish-Derived Products Rash       REVIEW OF SYSTEMS:   CONSTITUTIONAL: No weight loss, fever, chills, weakness or fatigue.    HEENT: Eyes: No visual loss, blurred vision, double vision or yellow sclerae. Ears, Nose, Throat: No hearing loss, sneezing, congestion, runny nose or sore throat. SKIN: No rash or itching. CARDIOVASCULAR: No chest pain, chest pressure or chest discomfort. No palpitations or edema. RESPIRATORY: No shortness of breath, cough or sputum. GASTROINTESTINAL: No anorexia, nausea, vomiting, diarrhea or constipation. No abdominal pain, hematochezia or melena. GENITOURINARY:No dysuria, urgency, frequency, hematuria. NEUROLOGICAL: No headache, dizziness, syncope, paralysis, ataxia,   numbness or tingling in the extremities. No change in bowel or bladder control. MUSCULOSKELETAL: No muscle pain, back pain, joint pain or stiffness. PSYCHIATRIC: No history of depression or anxiety. ENDOCRINOLOGIC: No reports of sweating, cold or heat intolerance. No polyuria or polydipsia. Objective     . /86   Pulse 82   Ht 5' 10.5\" (1.791 m)   Wt 203 lb (92.1 kg)   SpO2 98%   BMI 28.72 kg/m²     GENERAL:  Fan Zaidi is a 78 y.o.  male who is not in any acute distress. He was well attired and well groomed and was speaking in full sentences. HEENT:  Head:  Atraumatic, normocephalic. Eyes: Both the pupils are round,   equal and reactive to light. No squint noted. Normal red reflex is seen. Ears: Both external auditory canals are clear. No discharge noted. Tympanic membranes healthy. Normal light reflex is seen. Nose: Both the   nares are clear. No discharge noted. No epistaxis. Throat:  No posterior   pharyngeal wall erythema. Oral mucosa moist. No enlarged tonsils. NECK:  Supple. No cervical lymphadenopathy. No thyromegaly. LUNGS:  Normal ventilatory breath sounds are heard bilaterally. No crackles   or wheezes heard. CARDIOVASCULAR:  Normal S1, S2 heard. No murmurs heard. No JVD. GASTROINTESTINAL:  Abdomen soft, nontender. No guarding or rigidity noted. No organomegaly noted. Normal bowel sounds were heard.     EXTREMITIES:  All 4 extremities were moving fine. Full range of motion is   present. No deformity noted. Peripheral pulses were felt. No lower   extremity edema. Neurovascular integrity maintained. NEUROLOGICAL:  The patient was alert, conscious, and cooperative. Oriented   to time, place, and person. Muscle power in all 4 limbs is 5/5. Cranial   nerves II thru XII are grossly intact. No sensory or motor loss. PSYCHIATRIC:  Appropriate mood and affect. No flights of ideas or thoughts. Intact recent and past memory. No confusion or delirium. SKIN:  Normal healthy. No rashes, marks, moles, or tattoos visible anywhere   on the body. BACK:  Normal alignment of the spine. Full range of motion is present. No   stepoff. No deformity. No spinal or paraspinal tenderness noted. Assessment/Plan     Diagnoses and all orders for this visit:    Annual physical exam      Advise given:  - Drink plenty of fluids.   - Eat five servings of fruits and vegetables everyday. - Discussed importance of regular exercise and recommended starting or continuing a regular exercise program for good health. - The importance of annual eye exams was reviewed. - The importance of proper foot care and regularly checking feet to prevent sores and possibly loss of limbs was reviewed. - Appropriate handout were given to the patient. -  All the questions and concerns were appropriately answered. - Patient / family member / caregiver verbalized understanding of patient instructions from today's visit. - The patient was advised to follow up with me in 1 year for recheck or can call me before if has any other questions or concerns.

## 2022-03-14 NOTE — PATIENT INSTRUCTIONS
Patient Education        Well Visit, Over 72: Care Instructions  Overview     Well visits can help you stay healthy. Your doctor has checked your overall health and may have suggested ways to take good care of yourself. Your doctor also may have recommended tests. At home, you can help prevent illness with healthy eating, regular exercise, and other steps. Follow-up care is a key part of your treatment and safety. Be sure to make and go to all appointments, and call your doctor if you are having problems. It's also a good idea to know your test results and keep a list of the medicines you take. How can you care for yourself at home? · Get screening tests that you and your doctor decide on. Screening helps find diseases before any symptoms appear. · Eat healthy foods. Choose fruits, vegetables, whole grains, protein, and low-fat dairy foods. Limit fat, especially saturated fat. Reduce salt in your diet. · Limit alcohol. If you are a man, have no more than 2 drinks a day or 14 drinks a week. If you are a woman, have no more than 1 drink a day or 7 drinks a week. Since alcohol affects older adults differently, you may want to limit alcohol even more. Or you may not want to drink at all. · Get at least 30 minutes of exercise on most days of the week. Walking is a good choice. You also may want to do other activities, such as running, swimming, cycling, or playing tennis or team sports. · Reach and stay at a healthy weight. This will lower your risk for many problems, such as obesity, diabetes, heart disease, and high blood pressure. · Do not smoke. Smoking can make health problems worse. If you need help quitting, talk to your doctor about stop-smoking programs and medicines. These can increase your chances of quitting for good. · Care for your mental health. It is easy to get weighed down by worry and stress.  Learn strategies to manage stress, like deep breathing and mindfulness, and stay connected with your family and community. If you find you often feel sad or hopeless, talk with your doctor. Treatment can help. · Talk to your doctor about whether you have any risk factors for sexually transmitted infections (STIs). You can help prevent STIs if you wait to have sex with a new partner (or partners) until you've each been tested for STIs. It also helps if you use condoms (male or female condoms) and if you limit your sex partners to one person who only has sex with you. Vaccines are available for some STIs. · If you think you may have a problem with alcohol or drug use, talk to your doctor. This includes prescription medicines (such as amphetamines and opioids) and illegal drugs (such as cocaine and methamphetamine). Your doctor can help you figure out what type of treatment is best for you. · Protect your skin from too much sun. When you're outdoors from 10 a.m. to 4 p.m., stay in the shade or cover up with clothing and a hat with a wide brim. Wear sunglasses that block UV rays. Even when it's cloudy, put broad-spectrum sunscreen (SPF 30 or higher) on any exposed skin. · See a dentist one or two times a year for checkups and to have your teeth cleaned. · Wear a seat belt in the car. When should you call for help? Watch closely for changes in your health, and be sure to contact your doctor if you have any problems or symptoms that concern you. Where can you learn more? Go to https://[employer]. org and sign in to your [employer] account. Enter M290 in the [employer] box to learn more about \"Well Visit, Over 65: Care Instructions. \"     If you do not have an account, please click on the \"Sign Up Now\" link. Current as of: October 6, 2021               Content Version: 13.1  © 1799-7239 [employer]. Care instructions adapted under license by [facility] ([facility]).  If you have questions about a medical condition or this instruction, always ask your healthcare professional. [employer] disclaims any warranty or liability for your use of this information.

## 2023-02-21 ENCOUNTER — OFFICE VISIT (OUTPATIENT)
Dept: FAMILY MEDICINE CLINIC | Age: 68
End: 2023-02-21
Payer: COMMERCIAL

## 2023-02-21 VITALS
HEART RATE: 73 BPM | DIASTOLIC BLOOD PRESSURE: 82 MMHG | HEIGHT: 71 IN | WEIGHT: 195.2 LBS | TEMPERATURE: 96.8 F | BODY MASS INDEX: 27.33 KG/M2 | OXYGEN SATURATION: 96 % | SYSTOLIC BLOOD PRESSURE: 120 MMHG

## 2023-02-21 DIAGNOSIS — R10.32 LEFT INGUINAL PAIN: Primary | ICD-10-CM

## 2023-02-21 DIAGNOSIS — Z87.19 HISTORY OF INGUINAL HERNIA: ICD-10-CM

## 2023-02-21 PROCEDURE — 99213 OFFICE O/P EST LOW 20 MIN: CPT | Performed by: FAMILY MEDICINE

## 2023-02-21 PROCEDURE — 1123F ACP DISCUSS/DSCN MKR DOCD: CPT | Performed by: FAMILY MEDICINE

## 2023-02-21 SDOH — ECONOMIC STABILITY: HOUSING INSECURITY
IN THE LAST 12 MONTHS, WAS THERE A TIME WHEN YOU DID NOT HAVE A STEADY PLACE TO SLEEP OR SLEPT IN A SHELTER (INCLUDING NOW)?: NO

## 2023-02-21 SDOH — ECONOMIC STABILITY: FOOD INSECURITY: WITHIN THE PAST 12 MONTHS, THE FOOD YOU BOUGHT JUST DIDN'T LAST AND YOU DIDN'T HAVE MONEY TO GET MORE.: NEVER TRUE

## 2023-02-21 SDOH — ECONOMIC STABILITY: FOOD INSECURITY: WITHIN THE PAST 12 MONTHS, YOU WORRIED THAT YOUR FOOD WOULD RUN OUT BEFORE YOU GOT MONEY TO BUY MORE.: NEVER TRUE

## 2023-02-21 SDOH — ECONOMIC STABILITY: INCOME INSECURITY: HOW HARD IS IT FOR YOU TO PAY FOR THE VERY BASICS LIKE FOOD, HOUSING, MEDICAL CARE, AND HEATING?: NOT HARD AT ALL

## 2023-02-21 ASSESSMENT — PATIENT HEALTH QUESTIONNAIRE - PHQ9
SUM OF ALL RESPONSES TO PHQ9 QUESTIONS 1 & 2: 0
SUM OF ALL RESPONSES TO PHQ QUESTIONS 1-9: 0
2. FEELING DOWN, DEPRESSED OR HOPELESS: 0
SUM OF ALL RESPONSES TO PHQ QUESTIONS 1-9: 0
1. LITTLE INTEREST OR PLEASURE IN DOING THINGS: 0
SUM OF ALL RESPONSES TO PHQ QUESTIONS 1-9: 0
SUM OF ALL RESPONSES TO PHQ QUESTIONS 1-9: 0

## 2023-02-21 NOTE — PROGRESS NOTES
Subjective    Dena Lau is a 80 y.o. Male who came into the clinic today concerns of discomfort in the left inguinal region. The patient informs me that he has noticed pain in the left inguinal and groin area for past 7 to 10 days now. As   per the patient he was doing some exercises after which she started noticing discomfort in the area. The patient   informs me that gradually the pain has been improving. The patient has history of inguinal hernias bilaterally and   inguinal hernia repairs in the past.  So the patient is here to get himself checked. As per the patient he rates his   pain as 3 on a scale of 0-10 with 10 being the worst.  Strenuous physical activity makes the pain worse and rest   makes it feel better. Denies any overlying skin changes or bulges in the area. I discussed with the patient about   possible referral to the general surgeon for inguinal hernia repair versus watchful waiting. The patient informs me   that at this point since his pain is improving he would keep an eye on it and if the symptoms persist or worsens in   the near future we can make a referral for him to the general surgeon to discuss further treatment plan. Otherwise   today he did not have any other questions or concerns and all the question and concerns were appropriately answered.     Past Medical History:   Diagnosis Date    Chicken pox     Chronic back pain     Hemorrhoids     Hernia, inguinal     Herniated lumbar intervertebral disc     Mumps        Patient Active Problem List   Diagnosis    Contact dermatitis and other eczema, due to unspecified cause    Displacement of lumbar intervertebral disc without myelopathy    Allergic rhinitis    Vitamin D deficiency    Bradycardia    Lower urinary tract symptoms (LUTS)    Patient is Episcopal--SEE DURABLE POWER OF ATTY FOR HEALTHCARE IN SCANNED MEDIA    Sensory hearing loss, bilateral    FHx: prostate cancer    History of inguinal hernia       Past Surgical History:   Procedure Laterality Date    HERNIA REPAIR      INGUIOINAL AND UMBILICAL. [email or web address], 2008    TONSILLECTOMY      childhood    WISDOM TOOTH EXTRACTION         Family History   Problem Relation Age of Onset    High Blood Pressure Mother     Heart Disease Mother         a [de-identified], mvp    Atrial Fibrillation Mother     Cancer Father         prostate, stage II    Heart Disease Father         MI with stent in his early [de-identified]    High Cholesterol Father     High Blood Pressure Brother     High Cholesterol Brother     Cancer Maternal Grandfather     Colon Cancer Paternal Grandfather     Colon Cancer Maternal Aunt        Social History     Tobacco Use    Smoking status: Never    Smokeless tobacco: Never   Substance Use Topics    Alcohol use: No       Current Outpatient Medications on File Prior to Visit   Medication Sig Dispense Refill    Cholecalciferol (VITAMIN D3 PO) Take 800 Int'l Units by mouth daily      Calcium Carbonate (CALCIUM 600 PO) Take 1 tablet by mouth daily      Glucosamine-Chondroitin--400-375 MG TABS Take 1 tablet by mouth daily      POTASSIUM AMINOBENZOATE PO Take 1 capsule by mouth daily 550mg      magnesium (MAGNESIUM-OXIDE) 250 MG TABS tablet Take 250 mg by mouth daily      B Complex-Biotin-FA (B COMPLETE) TABS Take 0.5 tablets by mouth daily       aspirin 81 MG tablet Take 81 mg by mouth daily. Ascorbic Acid (VITAMIN C) 500 MG tablet Take 1,000 mg by mouth daily       multivitamin (THERAGRAN) per tablet Take 1 tablet by mouth daily. No current facility-administered medications on file prior to visit. Allergies   Allergen Reactions    Shellfish-Derived Products Rash       REVIEW OF SYSTEMS:   CONSTITUTIONAL: No weight loss, fever, chills, weakness or fatigue. HEENT: Eyes: No visual loss, blurred vision, double vision or yellow sclerae. Ears, Nose, Throat: No hearing loss, sneezing, congestion, runny nose or sore throat. SKIN: No rash or itching.   CARDIOVASCULAR: No chest pain, chest pressure or chest discomfort. No palpitations or edema.   RESPIRATORY: No shortness of breath, cough or sputum.   GASTROINTESTINAL: No anorexia, nausea, vomiting, diarrhea or constipation.   No abdominal pain, hematochezia or melena. Left inguinal discomfort.  GENITOURINARY:No dysuria, urgency, frequency, hematuria.  NEUROLOGICAL: No headache, dizziness, syncope, paralysis, ataxia,   numbness or tingling in the extremities. No change in bowel or bladder control.   MUSCULOSKELETAL: No muscle pain, back pain, joint pain or stiffness.    PSYCHIATRIC: No history of depression or anxiety.   ENDOCRINOLOGIC: No reports of sweating, cold or heat intolerance. No polyuria or polydipsia.     Objective     ./82   Pulse 73   Temp 96.8 °F (36 °C)   Ht 5' 10.5\" (1.791 m)   Wt 195 lb 3.2 oz (88.5 kg)   SpO2 96%   BMI 27.61 kg/m²     GENERAL:  Stew Cordova is a 68 y.o.  male who is not in any acute distress.  He was well attired and well groomed and was speaking in full sentences.    LUNGS:  Normal ventilatory breath sounds are heard bilaterally. No crackles   or wheezes heard.    CARDIOVASCULAR:  Normal S1, S2 heard. No murmurs heard. No JVD.    GENITOURINARY: External genitalia appears normal. Both the testes were present and   felt inside the scrotum. No hydrocele noted. Possible tiny left inguinal hernia noted.    Assessment/Plan     Diagnoses and all orders for this visit:    Left inguinal pain    History of inguinal hernia      Advise given:  - Drink plenty of fluids.   - Eat five servings of fruits and vegetables everyday.  - Appropriate handout were given to the patient.  -  All the questions and concerns were appropriately answered.  - Patient / family member / caregiver verbalized understanding of patient instructions from today's visit.   - The patient was advised to follow up with me in 3 months for recheck or can call me before if has any other questions or  concerns.

## 2023-11-30 ENCOUNTER — TELEPHONE (OUTPATIENT)
Dept: ENT CLINIC | Age: 68
End: 2023-11-30

## 2023-11-30 NOTE — TELEPHONE ENCOUNTER
Please call patient he has some question  on his INS vs the referral  I did let him know that he should call his INS with question but he did want to talk with you before the appt

## 2023-12-11 ENCOUNTER — PROCEDURE VISIT (OUTPATIENT)
Dept: AUDIOLOGY | Age: 68
End: 2023-12-11
Payer: MEDICARE

## 2023-12-11 DIAGNOSIS — H90.3 SENSORINEURAL HEARING LOSS (SNHL) OF BOTH EARS: Primary | ICD-10-CM

## 2023-12-11 PROCEDURE — 92567 TYMPANOMETRY: CPT | Performed by: AUDIOLOGIST

## 2023-12-11 PROCEDURE — 92557 COMPREHENSIVE HEARING TEST: CPT | Performed by: AUDIOLOGIST

## 2023-12-11 NOTE — PROGRESS NOTES
using recorded speech stimuli. Tympanometry: Normal peak pressure and compliance, Type A tympanogram, consistent with normal middle ear function. Reliability: Good  Transducer: Inserts    See scanned audiogram dated 12/11/2023 for results. PATIENT EDUCATION:       The following items were discussed with the patient:   - Good Communication Strategies  - Hearing Loss and Hearing Aids    Educational information was shared in the After Visit Summary. RECOMMENDATIONS:                                                                                                                                                                                                                                                                      The following items are recommended based on patient report and results from today's appointment:  - Continue medical follow-up with Luis Hall MD.  - Retest hearing as medically indicated and/or sooner if a change in hearing is noted. - If desired, schedule a Hearing Aid Evaluation (HAE) appointment to discuss hearing aid options. - Utilize \"Good Communication Strategies\" as discussed to assist in speech understanding with communication partners. - Use hearing protection devices (HPDs), such as protective ear muffs and ear plugs, when exposed to dangerous sound levels.          [facility] Felicia Chadwick  Audiologist       Chart CC'd to: Luis Hall MD       Degree of   Hearing Sensitivity dB Range   Within Normal Limits (WNL) 0 - 20   Mild 20 - 40   Moderate 40 - 55   Moderately-Severe 55 - 70   Severe 70 - 90   Profound 90 +

## 2023-12-11 NOTE — PATIENT INSTRUCTIONS
when you are exposed long-term to loud noise. Examples include listening to loud music, riding motorcycles, or being around other loud machines. Hearing loss can affect your work and home life. It can make you feel lonely or depressed. You may feel that you have lost your independence. But hearing aids and other devices can help you hear better and feel connected to others. Follow-up care is a key part of your treatment and safety. Be sure to make and go to all appointments, and call your doctor if you are having problems. It's also a good idea to know your test results and keep a list of the medicines you take. How can you care for yourself at home? Avoid loud noises whenever possible. This helps keep your hearing from getting worse. Always wear hearing protection around loud noises. If appropriate, wear hearing aid(s) as directed. It is recommended that hearing aids are worn during all waking hours to keep your brain active and give it access to the sounds it is missing. If you are beginning your process with hearing aid(s), schedule a \"Hearing Aid Evaluation\" with an audiologist to discuss your lifestyle, features of hearing aid technology, and styles of hearing aids available. It is recommended that you contact your insurance company to determine if you have a hearing aid benefit, as this may dictate who you can see for these services. Have hearing tests as your doctor suggests. They can show whether your hearing has changed. Your hearing aid may need to be adjusted. Use other assistive devices as needed. These may include:  Telephone amplifiers and hearing aids that can connect to a television, stereo, radio, or microphone. Devices that use lights or vibrations. These alert you to the doorbell, a ringing telephone, or a baby monitor. Television closed-captioning. This shows the words at the bottom of the screen. Most new TVs can do this. TTY (text telephone).  This lets you type messages

## 2024-01-10 ENCOUNTER — TELEPHONE (OUTPATIENT)
Dept: INTERNAL MEDICINE CLINIC | Age: 69
End: 2024-01-10

## 2024-01-10 DIAGNOSIS — U07.1 COVID-19: Primary | ICD-10-CM

## 2024-01-10 NOTE — TELEPHONE ENCOUNTER
I ordered Paxlovid to [facility] [place].    Tylenol for pain or fever     Robitussin DM for cough/congestion     Increase intake of fluids to stay hydrated     Monitor temperature ( thermometer) and oxygen saturation(using pulse oximetry) these are available at pharmacies and online . If oxygen saturation is less than 92%, got to emergency room.     If you develop severe shortness of breath go to the emergency room.     You should quarantine for 5 days if your symptoms have improved and your fever free you can end your quarantine and for the next 5 days be sure to wear a mask when you are around people.

## 2024-01-10 NOTE — TELEPHONE ENCOUNTER
Patient started feeling bad Monday (01/08/24) cough, runny nose, fatigue, no fever, body aches or chills.    Covid positive.    Interested in Paxlovid.    Please call and advise.    [id number] - [place], [place] - [address] - P [phone] - F [phone]

## 2024-01-30 ENCOUNTER — TELEPHONE (OUTPATIENT)
Dept: INTERNAL MEDICINE CLINIC | Age: 69
End: 2024-01-30

## 2024-01-30 NOTE — TELEPHONE ENCOUNTER
----- Message from Deb Parker sent at 1/29/2024  3:08 PM EST -----  Subject: Message to Provider    QUESTIONS  Information for Provider? Pt is requesting labs prior to the appointment   scheduled for 2/27/2024. Please give pt a call when the orders are in.  ---------------------------------------------------------------------------  --------------  CALL BACK INFO  [phone]; OK to leave message on voicemail  ---------------------------------------------------------------------------  --------------  SCRIPT ANSWERS  Relationship to Patient? Self

## 2024-02-22 DIAGNOSIS — Z13.9 SCREENING DUE: ICD-10-CM

## 2024-02-22 LAB
ALBUMIN SERPL-MCNC: 4.7 G/DL (ref 3.4–5)
ALBUMIN/GLOB SERPL: 2.2 {RATIO} (ref 1.1–2.2)
ALP SERPL-CCNC: 69 U/L (ref 40–129)
ALT SERPL-CCNC: 44 U/L (ref 10–40)
ANION GAP SERPL CALCULATED.3IONS-SCNC: 13 MMOL/L (ref 3–16)
AST SERPL-CCNC: 44 U/L (ref 15–37)
BILIRUB SERPL-MCNC: 0.5 MG/DL (ref 0–1)
BUN SERPL-MCNC: 16 MG/DL (ref 7–20)
CALCIUM SERPL-MCNC: 9.4 MG/DL (ref 8.3–10.6)
CHLORIDE SERPL-SCNC: 103 MMOL/L (ref 99–110)
CO2 SERPL-SCNC: 24 MMOL/L (ref 21–32)
CREAT SERPL-MCNC: 1 MG/DL (ref 0.8–1.3)
DEPRECATED RDW RBC AUTO: 13.7 % (ref 12.4–15.4)
GFR SERPLBLD CREATININE-BSD FMLA CKD-EPI: >60 ML/MIN/{1.73_M2}
GLUCOSE SERPL-MCNC: 105 MG/DL (ref 70–99)
HCT VFR BLD AUTO: 46.5 % (ref 40.5–52.5)
HGB BLD-MCNC: 16.2 G/DL (ref 13.5–17.5)
MCH RBC QN AUTO: 31.7 PG (ref 26–34)
MCHC RBC AUTO-ENTMCNC: 34.9 G/DL (ref 31–36)
MCV RBC AUTO: 91 FL (ref 80–100)
PLATELET # BLD AUTO: 180 K/UL (ref 135–450)
PMV BLD AUTO: 9.6 FL (ref 5–10.5)
POTASSIUM SERPL-SCNC: 4.1 MMOL/L (ref 3.5–5.1)
PROT SERPL-MCNC: 6.8 G/DL (ref 6.4–8.2)
PSA SERPL DL<=0.01 NG/ML-MCNC: 3.95 NG/ML (ref 0–4)
RBC # BLD AUTO: 5.11 M/UL (ref 4.2–5.9)
SODIUM SERPL-SCNC: 140 MMOL/L (ref 136–145)
TSH SERPL DL<=0.005 MIU/L-ACNC: 1.36 UIU/ML (ref 0.27–4.2)
WBC # BLD AUTO: 8.7 K/UL (ref 4–11)

## 2024-02-23 LAB
EST. AVERAGE GLUCOSE BLD GHB EST-MCNC: 114 MG/DL
HBA1C MFR BLD: 5.6 %

## 2024-02-24 SDOH — HEALTH STABILITY: PHYSICAL HEALTH: ON AVERAGE, HOW MANY MINUTES DO YOU ENGAGE IN EXERCISE AT THIS LEVEL?: 20 MIN

## 2024-02-24 SDOH — HEALTH STABILITY: PHYSICAL HEALTH: ON AVERAGE, HOW MANY DAYS PER WEEK DO YOU ENGAGE IN MODERATE TO STRENUOUS EXERCISE (LIKE A BRISK WALK)?: 3 DAYS

## 2024-02-24 ASSESSMENT — PATIENT HEALTH QUESTIONNAIRE - PHQ9
2. FEELING DOWN, DEPRESSED OR HOPELESS: 0
SUM OF ALL RESPONSES TO PHQ QUESTIONS 1-9: 0
1. LITTLE INTEREST OR PLEASURE IN DOING THINGS: 0
SUM OF ALL RESPONSES TO PHQ9 QUESTIONS 1 & 2: 0
SUM OF ALL RESPONSES TO PHQ QUESTIONS 1-9: 0

## 2024-02-24 ASSESSMENT — LIFESTYLE VARIABLES
HOW OFTEN DO YOU HAVE A DRINK CONTAINING ALCOHOL: NEVER
HOW OFTEN DO YOU HAVE A DRINK CONTAINING ALCOHOL: 1
HOW MANY STANDARD DRINKS CONTAINING ALCOHOL DO YOU HAVE ON A TYPICAL DAY: 0
HOW OFTEN DO YOU HAVE SIX OR MORE DRINKS ON ONE OCCASION: 1
HOW MANY STANDARD DRINKS CONTAINING ALCOHOL DO YOU HAVE ON A TYPICAL DAY: PATIENT DOES NOT DRINK

## 2024-02-27 ENCOUNTER — OFFICE VISIT (OUTPATIENT)
Dept: INTERNAL MEDICINE CLINIC | Age: 69
End: 2024-02-27
Payer: MEDICARE

## 2024-02-27 VITALS
SYSTOLIC BLOOD PRESSURE: 136 MMHG | HEART RATE: 67 BPM | WEIGHT: 196 LBS | BODY MASS INDEX: 27.44 KG/M2 | HEIGHT: 71 IN | OXYGEN SATURATION: 96 % | DIASTOLIC BLOOD PRESSURE: 80 MMHG | TEMPERATURE: 97.8 F

## 2024-02-27 DIAGNOSIS — Z00.00 WELCOME TO MEDICARE PREVENTIVE VISIT: Primary | ICD-10-CM

## 2024-02-27 DIAGNOSIS — Z00.00 PREVENTATIVE HEALTH CARE: Primary | ICD-10-CM

## 2024-02-27 PROCEDURE — G0402 INITIAL PREVENTIVE EXAM: HCPCS | Performed by: HOSPITALIST

## 2024-02-27 PROCEDURE — 1123F ACP DISCUSS/DSCN MKR DOCD: CPT | Performed by: HOSPITALIST

## 2024-02-27 SDOH — ECONOMIC STABILITY: FOOD INSECURITY: WITHIN THE PAST 12 MONTHS, YOU WORRIED THAT YOUR FOOD WOULD RUN OUT BEFORE YOU GOT MONEY TO BUY MORE.: NEVER TRUE

## 2024-02-27 SDOH — ECONOMIC STABILITY: FOOD INSECURITY: WITHIN THE PAST 12 MONTHS, THE FOOD YOU BOUGHT JUST DIDN'T LAST AND YOU DIDN'T HAVE MONEY TO GET MORE.: NEVER TRUE

## 2024-02-27 SDOH — ECONOMIC STABILITY: INCOME INSECURITY: HOW HARD IS IT FOR YOU TO PAY FOR THE VERY BASICS LIKE FOOD, HOUSING, MEDICAL CARE, AND HEATING?: NOT HARD AT ALL

## 2024-02-27 NOTE — PROGRESS NOTES
Medicare Annual Wellness Visit    Stew Cordova is here for Medicare AWV    Assessment & Plan   Welcome to Medicare preventive visit    Recommendations for Preventive Services Due: see orders and patient instructions/AVS.  Recommended screening schedule for the next 5-10 years is provided to the patient in written form: see Patient Instructions/AVS.     No follow-ups on file.     Subjective       Patient's complete Health Risk Assessment and screening values have been reviewed and are found in Flowsheets. The following problems were reviewed today and where indicated follow up appointments were made and/or referrals ordered.    Positive Risk Factor Screenings with Interventions:                    Safety:  Do you have non-slip mats or non-slip surfaces or shower bars or grab bars in your shower or bathtub?: (!) No  Interventions:  Patient declined any further interventions or treatment                   Objective   Vitals:    02/27/24 1445   BP: 136/80   Pulse: 67   Temp: 97.8 °F (36.6 °C)   SpO2: 96%   Weight: 88.9 kg (196 lb)   Height: 1.791 m (5' 10.5\")      Body mass index is 27.73 kg/m².             Allergies   Allergen Reactions    Shellfish-Derived Products Rash     Prior to Visit Medications    Medication Sig Taking? Authorizing Provider   Calcium Carbonate (CALCIUM 600 PO) Take 1 tablet by mouth daily Yes Cynthia Bermudez MD   Glucosamine-Chondroitin--400-375 MG TABS Take 1 tablet by mouth daily Yes ProviderCynthia MD   POTASSIUM AMINOBENZOATE PO Take 1 capsule by mouth daily 550mg Yes ProviderCynthia MD   magnesium (MAGNESIUM-OXIDE) 250 MG TABS tablet Take 1 tablet by mouth daily Yes ProviderCynthia MD   Ascorbic Acid (VITAMIN C) 500 MG tablet Take 2 tablets by mouth daily Yes Andrea Marquez MD       [facility] (Including outside providers/suppliers regularly involved in providing care):   Patient Care Team:  Atiya Acevedo MD as PCP - General (Internal Medicine)  [place]

## 2024-02-27 NOTE — PATIENT INSTRUCTIONS
Wait for an ambulance. Do not try to drive yourself.  Watch closely for changes in your health, and be sure to contact your doctor if you have any problems.  Where can you learn more?  Go to https://www.[employer].net/patientEd and enter F075 to learn more about \"A Healthy Heart: Care Instructions.\"  Current as of: June 25, 2023               Content Version: 13.9  © 0993-1892 [employer].   Care instructions adapted under license by [employer]. If you have questions about a medical condition or this instruction, always ask your healthcare professional. [employer] disclaims any warranty or liability for your use of this information.      Personalized Preventive Plan for Stew Cordova - 2/27/2024  Medicare offers a range of preventive health benefits. Some of the tests and screenings are paid in full while other may be subject to a deductible, co-insurance, and/or copay.    Some of these benefits include a comprehensive review of your medical history including lifestyle, illnesses that may run in your family, and various assessments and screenings as appropriate.    After reviewing your medical record and screening and assessments performed today your provider may have ordered immunizations, labs, imaging, and/or referrals for you.  A list of these orders (if applicable) as well as your Preventive Care list are included within your After Visit Summary for your review.    Other Preventive Recommendations:    A preventive eye exam performed by an eye specialist is recommended every 1-2 years to screen for glaucoma; cataracts, macular degeneration, and other eye disorders.  A preventive dental visit is recommended every 6 months.  Try to get at least 150 minutes of exercise per week or 10,000 steps per day on a pedometer .  Order or download the FREE \"Exercise & Physical Activity: Your Everyday Guide\" from [employer]. Call 1-677.197.6153 or search [employer]

## 2024-07-03 DIAGNOSIS — R10.9 ABDOMINAL PAIN, UNSPECIFIED ABDOMINAL LOCATION: ICD-10-CM

## 2024-07-03 DIAGNOSIS — Z12.11 ENCOUNTER FOR SCREENING COLONOSCOPY: Primary | ICD-10-CM

## 2024-10-17 ENCOUNTER — ANESTHESIA EVENT (OUTPATIENT)
Dept: ENDOSCOPY | Age: 69
End: 2024-10-17
Payer: MEDICARE

## 2024-10-17 ENCOUNTER — HOSPITAL ENCOUNTER (OUTPATIENT)
Age: 69
Setting detail: OUTPATIENT SURGERY
Discharge: HOME | End: 2024-10-17
Attending: INTERNAL MEDICINE | Admitting: INTERNAL MEDICINE
Payer: MEDICARE

## 2024-10-17 ENCOUNTER — APPOINTMENT (OUTPATIENT)
Dept: ENDOSCOPY | Age: 69
End: 2024-10-17
Attending: INTERNAL MEDICINE
Payer: MEDICARE

## 2024-10-17 ENCOUNTER — ANESTHESIA (OUTPATIENT)
Dept: ENDOSCOPY | Age: 69
End: 2024-10-17
Payer: MEDICARE

## 2024-10-17 VITALS
SYSTOLIC BLOOD PRESSURE: 109 MMHG | BODY MASS INDEX: 27.72 KG/M2 | OXYGEN SATURATION: 97 % | DIASTOLIC BLOOD PRESSURE: 85 MMHG | RESPIRATION RATE: 18 BRPM | WEIGHT: 198 LBS | HEART RATE: 51 BPM | HEIGHT: 71 IN | TEMPERATURE: 97 F

## 2024-10-17 PROCEDURE — 7100000011 HC PHASE II RECOVERY - ADDTL 15 MIN: Performed by: INTERNAL MEDICINE

## 2024-10-17 PROCEDURE — 7100000010 HC PHASE II RECOVERY - FIRST 15 MIN: Performed by: INTERNAL MEDICINE

## 2024-10-17 PROCEDURE — 2500000003 HC RX 250 WO HCPCS

## 2024-10-17 PROCEDURE — 3609027000 HC COLONOSCOPY: Performed by: INTERNAL MEDICINE

## 2024-10-17 PROCEDURE — 6360000002 HC RX W HCPCS

## 2024-10-17 PROCEDURE — 3700000000 HC ANESTHESIA ATTENDED CARE: Performed by: INTERNAL MEDICINE

## 2024-10-17 PROCEDURE — 3700000001 HC ADD 15 MINUTES (ANESTHESIA): Performed by: INTERNAL MEDICINE

## 2024-10-17 RX ORDER — GLYCOPYRROLATE 0.2 MG/ML
INJECTION INTRAMUSCULAR; INTRAVENOUS
Status: DISCONTINUED | OUTPATIENT
Start: 2024-10-17 | End: 2024-10-17 | Stop reason: SDUPTHER

## 2024-10-17 RX ORDER — LIDOCAINE HYDROCHLORIDE 20 MG/ML
INJECTION, SOLUTION EPIDURAL; INFILTRATION; INTRACAUDAL; PERINEURAL
Status: DISCONTINUED | OUTPATIENT
Start: 2024-10-17 | End: 2024-10-17 | Stop reason: SDUPTHER

## 2024-10-17 RX ORDER — PROPOFOL 10 MG/ML
INJECTION, EMULSION INTRAVENOUS
Status: DISCONTINUED | OUTPATIENT
Start: 2024-10-17 | End: 2024-10-17 | Stop reason: SDUPTHER

## 2024-10-17 RX ORDER — SODIUM CHLORIDE, SODIUM LACTATE, POTASSIUM CHLORIDE, CALCIUM CHLORIDE 600; 310; 30; 20 MG/100ML; MG/100ML; MG/100ML; MG/100ML
INJECTION, SOLUTION INTRAVENOUS CONTINUOUS
Status: DISCONTINUED | OUTPATIENT
Start: 2024-10-17 | End: 2024-10-17 | Stop reason: HOSPADM

## 2024-10-17 RX ADMIN — PROPOFOL 100 MG: 10 INJECTION, EMULSION INTRAVENOUS at 11:33

## 2024-10-17 RX ADMIN — LIDOCAINE HYDROCHLORIDE 50 MG: 20 INJECTION, SOLUTION EPIDURAL; INFILTRATION; INTRACAUDAL; PERINEURAL at 11:27

## 2024-10-17 RX ADMIN — PROPOFOL 100 MG: 10 INJECTION, EMULSION INTRAVENOUS at 11:27

## 2024-10-17 RX ADMIN — GLYCOPYRROLATE 0.2 MG: 0.2 INJECTION INTRAMUSCULAR; INTRAVENOUS at 11:24

## 2024-10-17 RX ADMIN — PROPOFOL 100 MG: 10 INJECTION, EMULSION INTRAVENOUS at 11:30

## 2024-10-17 ASSESSMENT — PAIN - FUNCTIONAL ASSESSMENT: PAIN_FUNCTIONAL_ASSESSMENT: 0-10

## 2024-10-17 NOTE — PROCEDURES
Colonoscopy Procedure  Note          Patient: Stew Cordova  : 1955  CRN:  @CRN@    Procedure: Colonoscopy     Date:  10/17/2024    Surgeon:  Jesus Hartley MD, MD    Referring Physician:  Atiya Acevedo MD    Preoperative Diagnosis:  History of colon polyps [Z86.010]    Postoperative Diagnosis: Diverticulosis of the sigmoid  Internal hemorrhoid  Otherwise normal colonoscopy and normal first 5 cm of the terminal ileum    Anesthesia:  MAC    EBL: Minimal to none.    Indications: This is a 69 y.o. year old male who comes in today for colonoscopy he has had previous history of colon polyps he is a Jewish and does not want any blood products    Procedure:   An informed consent was obtained from the patient after explanation of indications, benefits, possible risks and complications of the procedure.  The patient was then taken to the endoscopy suite, placed in the left lateral decubitus position, and the above IV anesthesia was administered.      Digital rectal examination was performed.  No masses good rectal tone      Rectum normal on 4 view retroflex view does show internal hemorrhoids    Sigmoid there is scattered diverticula    Descending normal    Transverse normal    Ascending normal    Cecum normal    TI normal for the first 5 cm    Preparation was excellent      The patient tolerated the procedure well and was taken to the PACU in good condition.  There were no immediate complications.      Impression: Scattered diverticulosis of the sigmoid colon  Internal hemorrhoid    Recommendations: Regular diet today  Patient can have a repeat colonoscopy in 5 to 7 years  Thank you for this kind referral    Jesus Hartley MD, MD   [facility]  10/17/2024      Please note that some or all of this record was generated using voice recognition software. If there are any questions about the content of this document, please contact the author as some errors in translation may have occurred.PROCEDURE

## 2024-10-17 NOTE — H&P
Gastroenterology Note             Pre-operative History and Physical    Patient: Stew Cordova  : 1955  CSN:     History Obtained From:  patient and/or guardian.     HISTORY OF PRESENT ILLNESS:    The patient is a 69 y.o. male  here for/colonoscopy.   This a very pleasant 69-year-old male comes in today for colonoscopy has had a previous history of colon polyps he is a Jew and does not want any blood products    Past Medical History:    Past Medical History:   Diagnosis Date    Chicken pox     Chronic back pain     Hemorrhoids     Hernia, inguinal     Herniated lumbar intervertebral disc     Mumps      Past Surgical History:    Past Surgical History:   Procedure Laterality Date    HERNIA REPAIR      INGUIOINAL AND UMBILICAL. , , , ,     TONSILLECTOMY      childhood    WISDOM TOOTH EXTRACTION       Medications Prior to Admission:   No current facility-administered medications on file prior to encounter.     Current Outpatient Medications on File Prior to Encounter   Medication Sig Dispense Refill    Glucosamine-Chondroitin--400-375 MG TABS Take 1 tablet by mouth daily      POTASSIUM AMINOBENZOATE PO Take 1 capsule by mouth daily 550mg      magnesium (MAGNESIUM-OXIDE) 250 MG TABS tablet Take 1 tablet by mouth daily      Ascorbic Acid (VITAMIN C) 500 MG tablet Take 2 tablets by mouth daily      Calcium Carbonate (CALCIUM 600 PO) Take 1 tablet by mouth daily (Patient not taking: Reported on 10/17/2024)          Allergies:  Shellfish-derived products      Social History:   Social History     Tobacco Use    Smoking status: Never     Passive exposure: Never    Smokeless tobacco: Never   Substance Use Topics    Alcohol use: No     Family History:   Family History   Problem Relation Age of Onset    High Blood Pressure Mother     Heart Disease Mother         a fib, mvp    Atrial Fibrillation Mother     Cancer Father         prostate, stage II    Heart Disease

## 2024-10-17 NOTE — DISCHARGE INSTRUCTIONS
ENDOSCOPY DISCHARGE INSTRUCTIONS:    Call the physician that did your procedure for any questions or concern:    [facility]: [phone]  DR. MERLENE HARTLEY       ACTIVITY:    There are potential side effects to the medications used for sedation and anesthesia during your procedure.  These include:  Dizziness or light-headedness, confusion or memory loss, delayed reaction times, loss of coordination, nausea and vomiting.  Because of your increased risk for injury, we ask that you observe the following precautions:  For the next 24 hours,  DO NOT operate an automobile, bicycle, motorcycle, , power tools or large equipment of any kind.  Do not drink alcohol, sign any legal documents or make any legal decisions for 24 hours.  Do not bend your head over lower than your heart.  DO sit on the side of bed/couch awhile before getting up.  Plan on bedrest or quiet relaxation today.  You may resume normal activities in 24 hours.    DIET:    Your first meal today should be light, avoiding spicy and fatty foods.  If you tolerate this first meal, then you may advance to your regular diet unless otherwise advised by your physician.    NORMAL SYMPTOMS:  -Mild sore throat if you’ve had an EGD   -Gaseous discomfort    NOTIFY YOUR PHYSICIAN IF THESE SYMPTOMS OCCUR:  1. Fever (greater than 100)  5. Increased abdominal bloating  2. Severe pain    6. Excessive bleeding  3. Nausea and vomiting  7. Chest pain                                                                    4. Chills    8. Shortness of breath    ADDITIONAL INSTRUCTIONS:    Biopsy results: Call [facility] for biopsy results in 1 week    Educational Information:       Impression: Scattered diverticulosis of the sigmoid colon  Internal hemorrhoid     Recommendations: Regular diet today  Patient can have a repeat colonoscopy in 5 to 7 years  Thank you for this kind referral     Merlene Hartley MD, MD   [facility]  10/17/2024        Please note that some or all of

## 2024-10-17 NOTE — ANESTHESIA POSTPROCEDURE EVALUATION
Department of Anesthesiology  Postprocedure Note    Patient: Stew Cordova  MRN: [id number]  YOB: 1955  Date of evaluation: 10/17/2024    Procedure Summary       Date: 10/17/24 Room / Location: [facility] ENDO 01 / [facility]    Anesthesia Start: 1119 Anesthesia Stop: 1148    Procedure: COLONOSCOPY Diagnosis:       History of colon polyps      (History of colon polyps [Z86.010])    Surgeons: Jesus Hartley MD Responsible Provider: Jacob France MD    Anesthesia Type: MAC ASA Status: 1            Anesthesia Type: No value filed.    Ellie Phase I: Ellie Score: 10    Ellie Phase II: Ellie Score: 10    Anesthesia Post Evaluation    Patient location during evaluation: PACU  Patient participation: complete - patient participated  Level of consciousness: awake and alert  Airway patency: patent  Nausea & Vomiting: no nausea and no vomiting  Cardiovascular status: hemodynamically stable  Respiratory status: acceptable  Hydration status: euvolemic  Multimodal analgesia pain management approach  Pain management: satisfactory to patient    No notable events documented.

## 2024-10-17 NOTE — ANESTHESIA PRE PROCEDURE
Department of Anesthesiology  Preprocedure Note       Name:  Stew Cordova   Age:  69 y.o.  :  1955                                          MRN:  [id number]         Date:  10/17/2024      Surgeon: Surgeon(s):  Jesus Hartley MD    Procedure: Procedure(s):  COLONOSCOPY    Medications prior to admission:   Prior to Admission medications    Medication Sig Start Date End Date Taking? Authorizing Provider   Glucosamine-Chondroitin--400-375 MG TABS Take 1 tablet by mouth daily   Yes Cynthia Bermudez MD   POTASSIUM AMINOBENZOATE PO Take 1 capsule by mouth daily 550mg   Yes Cynthia Bermudez MD   magnesium (MAGNESIUM-OXIDE) 250 MG TABS tablet Take 1 tablet by mouth daily   Yes Cynthia Bermudez MD   Ascorbic Acid (VITAMIN C) 500 MG tablet Take 2 tablets by mouth daily   Yes Andrea Marquez MD   Calcium Carbonate (CALCIUM 600 PO) Take 1 tablet by mouth daily  Patient not taking: Reported on 10/17/2024    yCnthia Bermudez MD       Current medications:    Current Facility-Administered Medications   Medication Dose Route Frequency Provider Last Rate Last Admin   • lactated ringers IV soln infusion   IntraVENous Continuous Anirudh Lopes MD           Allergies:    Allergies   Allergen Reactions   • Shellfish-Derived Products Rash       Problem List:    Patient Active Problem List   Diagnosis Code   • Contact dermatitis and other eczema, due to unspecified cause L25.9   • Displacement of lumbar intervertebral disc without myelopathy M51.26   • Allergic rhinitis J30.9   • Vitamin D deficiency E55.9   • Bradycardia R00.1   • Lower urinary tract symptoms (LUTS) R39.9   • Patient is Congregational--SEE DURABLE POWER OF ATTY FOR HEALTHCARE IN SCANNED MEDIA [id number]   • Sensory hearing loss, 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

## 2024-10-17 NOTE — PROGRESS NOTES
Ambulatory Surgery/Procedure Discharge Note    Vitals:    10/17/24 1155   BP: 94/77   Pulse: 63   Resp:    Temp: 97 °F (36.1 °C)   SpO2: 98%     Phase 2 endo post.see flow sheet.  Spoke with pt and wife      No intake/output data recorded.    Restroom use offered before discharge.  Yes    Pain assessment:  none  Pain Level: 0/10    1208 reviewed d/c instructions with  pt and his wife  both verbalized their understanding  1225  D/c iv   1230  Patient discharged to home/self care. Patient discharged via wheel chair by transporter to waiting family/S.O.       10/17/2024